# Patient Record
Sex: MALE
[De-identification: names, ages, dates, MRNs, and addresses within clinical notes are randomized per-mention and may not be internally consistent; named-entity substitution may affect disease eponyms.]

---

## 2021-11-04 ENCOUNTER — APPOINTMENT (OUTPATIENT)
Dept: SURGERY | Facility: CLINIC | Age: 61
End: 2021-11-04
Payer: COMMERCIAL

## 2021-11-04 VITALS
SYSTOLIC BLOOD PRESSURE: 132 MMHG | HEART RATE: 57 BPM | HEIGHT: 71 IN | OXYGEN SATURATION: 99 % | DIASTOLIC BLOOD PRESSURE: 76 MMHG | WEIGHT: 282 LBS | BODY MASS INDEX: 39.48 KG/M2

## 2021-11-04 VITALS — TEMPERATURE: 96.9 F

## 2021-11-04 DIAGNOSIS — K80.20 CALCULUS OF GALLBLADDER W/OUT CHOLECYSTITIS W/OUT OBSTRUCTION: ICD-10-CM

## 2021-11-04 DIAGNOSIS — E66.01 MORBID (SEVERE) OBESITY DUE TO EXCESS CALORIES: ICD-10-CM

## 2021-11-04 DIAGNOSIS — K81.9 CHOLECYSTITIS, UNSPECIFIED: ICD-10-CM

## 2021-11-04 PROCEDURE — 99243 OFF/OP CNSLTJ NEW/EST LOW 30: CPT

## 2021-11-04 NOTE — CONSULT LETTER
[Dear  ___] : Dear  [unfilled], [Consult Letter:] : I had the pleasure of evaluating your patient, [unfilled]. [Please see my note below.] : Please see my note below. [Consult Closing:] : Thank you very much for allowing me to participate in the care of this patient.  If you have any questions, please do not hesitate to contact me. [Sincerely,] : Sincerely, [FreeTextEntry3] : Stefano Ferrari MD, FACS

## 2021-11-04 NOTE — HISTORY OF PRESENT ILLNESS
[de-identified] : Mr. JENA VARGHESE is a 60 year  old patient who was referred by Dr. Constatnin Hamm with the chief complaint of having  Gb stones. He denies right upper quadrant or epigastric .  He reports no nausea or vomiting and no history of jaundice, acholia or choluria.   Appetite is good and weight is stable.   He   has no family history of biliary tract disease.  He had  a CT scan of the  abdomen and pelvis in [facility] on  10/19/2021 which revealed GB stones. CBD was normal.   he went to the hospital due to the pain and was diagnosed with constipation. the symptoms resolved after he took laxatives.  patient has a history of Lap band placement by me in 2005 and removal in 2007. he had a sleeve gastrectomy in 2010.

## 2021-11-04 NOTE — PHYSICAL EXAM
[Abdominal Masses] : No abdominal masses [Abdomen Tenderness] : ~T ~M No abdominal tenderness [Alert] : alert [Oriented to Person] : oriented to person [Oriented to Place] : oriented to place [Oriented to Time] : oriented to time [Calm] : calm [de-identified] : He  is alert, well-groomed, and in NAD\par   [de-identified] : anicteric.  Nasal mucosa pink, septum midline. Oral mucosa pink.  Tongue midline, Pharynx without exudates.\par   [de-identified] : Neck supple. Trachea midline. Thyroid isthmus barely palpable, lobes not felt.\par   [de-identified] : obese abdomen, well healed  lap incisions, small  umbilical and infraumbilical hernias. asymptomatic

## 2021-11-04 NOTE — PLAN
[FreeTextEntry1] : Patient is with asymptomatic cholelithiasis.   Patient is very hesitant to pursue any surgical intervention.  Given presence of gallstones, laparoscopic, possibly open cholecystectomy was discussed. Patient does not wish to pursue surgery at this time. Patient instructed to maintain a fat-free diet, and to seek immediate medical attention with any acute change or worsening of symptoms, including but not limited to abdominal pain, fever, chills, nausea, vomiting, or yellowing of the skin. Patient’s questions and concerns addressed to patient’s satisfaction.  Patient verbalized an understanding of the information discussed. \par \par